# Patient Record
Sex: MALE | URBAN - METROPOLITAN AREA
[De-identification: names, ages, dates, MRNs, and addresses within clinical notes are randomized per-mention and may not be internally consistent; named-entity substitution may affect disease eponyms.]

---

## 2019-03-12 ENCOUNTER — OFFICE VISIT (OUTPATIENT)
Dept: URBAN - METROPOLITAN AREA CLINIC 23 | Facility: CLINIC | Age: 69
End: 2019-03-12
Payer: MEDICARE

## 2019-03-12 PROCEDURE — 92014 COMPRE OPH EXAM EST PT 1/>: CPT | Performed by: OPTOMETRIST

## 2019-03-12 ASSESSMENT — INTRAOCULAR PRESSURE
OD: 15
OS: 15

## 2019-03-12 ASSESSMENT — KERATOMETRY
OD: 42.00
OS: 41.50

## 2019-03-12 NOTE — IMPRESSION/PLAN
Impression: Type 2 diabetes mellitus w/o complication: I25.1. Plan: Diabetes type II: no background retinopathy, no signs of neovascularization noted. Discussed ocular and systemic benefits of blood sugar control.

## 2021-04-14 ENCOUNTER — OFFICE VISIT (OUTPATIENT)
Dept: URBAN - METROPOLITAN AREA CLINIC 23 | Facility: CLINIC | Age: 71
End: 2021-04-14
Payer: MEDICARE

## 2021-04-14 DIAGNOSIS — E11.9 TYPE 2 DIABETES MELLITUS WITHOUT COMPLICATIONS: Primary | ICD-10-CM

## 2021-04-14 PROCEDURE — 99213 OFFICE O/P EST LOW 20 MIN: CPT | Performed by: OPTOMETRIST

## 2021-04-14 ASSESSMENT — KERATOMETRY
OD: 41.88
OS: 41.50

## 2021-04-14 ASSESSMENT — INTRAOCULAR PRESSURE
OS: 13
OD: 13

## 2021-04-14 NOTE — IMPRESSION/PLAN
Impression: Type 2 diabetes mellitus without complications: U48.9. Plan: Diabetes type II: no background retinopathy, no signs of neovascularization noted. Discussed ocular and systemic benefits of blood sugar control. Continue tight glucose control.

## 2022-05-25 ENCOUNTER — OFFICE VISIT (OUTPATIENT)
Dept: URBAN - METROPOLITAN AREA CLINIC 22 | Facility: CLINIC | Age: 72
End: 2022-05-25
Payer: MEDICARE

## 2022-05-25 DIAGNOSIS — H52.4 PRESBYOPIA: ICD-10-CM

## 2022-05-25 DIAGNOSIS — H43.813 VITREOUS DEGENERATION, BILATERAL: ICD-10-CM

## 2022-05-25 DIAGNOSIS — E11.9 TYPE 2 DIABETES MELLITUS W/O COMPLICATION: Primary | ICD-10-CM

## 2022-05-25 DIAGNOSIS — H04.123 DRY EYE SYNDROME OF BILATERAL LACRIMAL GLANDS: ICD-10-CM

## 2022-05-25 DIAGNOSIS — Z96.1 PRESENCE OF INTRAOCULAR LENS: ICD-10-CM

## 2022-05-25 PROCEDURE — 92134 CPTRZ OPH DX IMG PST SGM RTA: CPT | Performed by: STUDENT IN AN ORGANIZED HEALTH CARE EDUCATION/TRAINING PROGRAM

## 2022-05-25 PROCEDURE — 92014 COMPRE OPH EXAM EST PT 1/>: CPT | Performed by: STUDENT IN AN ORGANIZED HEALTH CARE EDUCATION/TRAINING PROGRAM

## 2022-05-25 ASSESSMENT — INTRAOCULAR PRESSURE
OS: 12
OD: 14

## 2022-05-25 ASSESSMENT — VISUAL ACUITY
OD: 20/25
OS: 20/40

## 2022-05-25 NOTE — IMPRESSION/PLAN
Impression: Presbyopia: H52.4. Plan: Refractive error accounts for patient's complaints. Recommend new glasses Rx, but difficult refraction today due to poor ocular surface. See #4. RTC for refraction in 2-3 weeks.

## 2022-05-25 NOTE — IMPRESSION/PLAN
Impression: Type 2 diabetes mellitus w/o complication: Q62.0. Plan: Discussed findings, no retinopathy or macular edema OU. Patient educated on importance of well-controlled blood sugar/pressure, risk of vision loss from diabetic retinopathy, and dilated eye exams. Continue management with PCP.

## 2023-09-26 ENCOUNTER — OFFICE VISIT (OUTPATIENT)
Dept: URBAN - METROPOLITAN AREA CLINIC 23 | Facility: CLINIC | Age: 73
End: 2023-09-26
Payer: MEDICARE

## 2023-09-26 DIAGNOSIS — E11.3291 TYPE 2 DIAB W MILD NONPRLF DIABETIC RTNOP W/O MACULAR EDEMA, RIGHT EYE: Primary | ICD-10-CM

## 2023-09-26 DIAGNOSIS — H16.143 PUNCTATE KERATITIS, BILATERAL: ICD-10-CM

## 2023-09-26 DIAGNOSIS — H26.491 OTHER SECONDARY CATARACT, RIGHT EYE: ICD-10-CM

## 2023-09-26 PROCEDURE — 99213 OFFICE O/P EST LOW 20 MIN: CPT | Performed by: OPTOMETRIST

## 2023-09-26 ASSESSMENT — KERATOMETRY
OS: 41.25
OD: 41.25

## 2023-09-26 ASSESSMENT — INTRAOCULAR PRESSURE
OS: 16
OD: 13